# Patient Record
Sex: FEMALE | Race: ASIAN | NOT HISPANIC OR LATINO
[De-identification: names, ages, dates, MRNs, and addresses within clinical notes are randomized per-mention and may not be internally consistent; named-entity substitution may affect disease eponyms.]

---

## 2022-06-09 ENCOUNTER — RESULT REVIEW (OUTPATIENT)
Age: 72
End: 2022-06-09

## 2022-10-17 PROBLEM — Z00.00 ENCOUNTER FOR PREVENTIVE HEALTH EXAMINATION: Status: ACTIVE | Noted: 2022-10-17

## 2022-10-18 ENCOUNTER — NON-APPOINTMENT (OUTPATIENT)
Age: 72
End: 2022-10-18

## 2022-10-18 ENCOUNTER — APPOINTMENT (OUTPATIENT)
Dept: RADIATION ONCOLOGY | Facility: CLINIC | Age: 72
End: 2022-10-18

## 2022-10-18 VITALS — RESPIRATION RATE: 18 BRPM | BODY MASS INDEX: 23.16 KG/M2 | WEIGHT: 139 LBS | HEIGHT: 65 IN

## 2022-10-18 DIAGNOSIS — Z82.49 FAMILY HISTORY OF ISCHEMIC HEART DISEASE AND OTHER DISEASES OF THE CIRCULATORY SYSTEM: ICD-10-CM

## 2022-10-18 DIAGNOSIS — E11.9 TYPE 2 DIABETES MELLITUS W/OUT COMPLICATIONS: ICD-10-CM

## 2022-10-18 DIAGNOSIS — Z78.9 OTHER SPECIFIED HEALTH STATUS: ICD-10-CM

## 2022-10-18 DIAGNOSIS — I10 ESSENTIAL (PRIMARY) HYPERTENSION: ICD-10-CM

## 2022-10-18 PROCEDURE — 99204 OFFICE O/P NEW MOD 45 MIN: CPT | Mod: 25

## 2022-10-18 RX ORDER — LOSARTAN POTASSIUM 100 MG/1
TABLET, FILM COATED ORAL
Refills: 0 | Status: ACTIVE | COMMUNITY

## 2022-10-18 RX ORDER — MULTIVIT-MIN/FOLIC/VIT K/LYCOP 400-300MCG
25 MCG TABLET ORAL
Refills: 0 | Status: ACTIVE | COMMUNITY

## 2022-10-18 RX ORDER — METFORMIN HYDROCHLORIDE 625 MG/1
TABLET ORAL
Refills: 0 | Status: ACTIVE | COMMUNITY

## 2022-10-18 NOTE — PHYSICAL EXAM
[Normal] : well developed, well nourished, in no acute distress [Sclera] : the sclera and conjunctiva were normal [] : no respiratory distress

## 2022-10-18 NOTE — REASON FOR VISIT
[Consideration of Curative Therapy] : consideration of curative therapy for [Home] : at home, [unfilled] , at the time of the visit. [Medical Office: ([facility])___] : at the medical office located in  [Verbal consent obtained from patient] : the patient, [unfilled] [Breast Cancer] : breast cancer

## 2022-10-21 ENCOUNTER — OUTPATIENT (OUTPATIENT)
Dept: OUTPATIENT SERVICES | Facility: HOSPITAL | Age: 72
LOS: 1 days | Discharge: HOME | End: 2022-10-21

## 2022-10-21 DIAGNOSIS — D05.11 INTRADUCTAL CARCINOMA IN SITU OF RIGHT BREAST: ICD-10-CM

## 2022-10-21 PROCEDURE — 77290 THER RAD SIMULAJ FIELD CPLX: CPT | Mod: 26

## 2022-10-21 PROCEDURE — 77333 RADIATION TREATMENT AID(S): CPT | Mod: 26

## 2022-10-21 NOTE — HISTORY OF PRESENT ILLNESS
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 posterior margin <1mm. On arimidex with Dr. ilya chambers. \par \par MRI 7/1/222 - [facility], preop - probably residual disease near clips right breast\par pet 7/8/22 - medial right breast with likely benign b/l neck nodes and oropharynx avidity. Small nonavid lung nodules for 6mo FU. \par Dexa with 6.8 % fracture risk. \par 8/2022 -- right breast lumpectomy in 8/2022 @ [facility]\par \par 10/18/22 -- Today for radiation consult. No chest pain, cough. Will see Med Onc soon.

## 2022-10-21 NOTE — DISEASE MANAGEMENT
[Pathological] : TNM Stage: p [0] : 0 [FreeTextEntry4] : right DCIS [TTNM] : is [NTNM] : x [MTNM] : x

## 2022-11-17 ENCOUNTER — NON-APPOINTMENT (OUTPATIENT)
Age: 72
End: 2022-11-17

## 2022-11-26 NOTE — REVIEW OF SYSTEMS
[Dysphagia: Grade 0] : Dysphagia: Grade 0 [Cough: Grade 0] : Cough: Grade 0 [Alopecia: Grade 0] : Alopecia: Grade 0 [Pruritus: Grade 0] : Pruritus: Grade 0 [Skin Atrophy: Grade 0] : Skin Atrophy: Grade 0 [Skin Hyperpigmentation: Grade 0] : Skin Hyperpigmentation: Grade 0 [Skin Induration: Grade 0] : Skin Induration: Grade 0 [Dermatitis Radiation: Grade 0] : Dermatitis Radiation: Grade 0 [Negative] : Allergic/Immunologic [FreeTextEntry5] : HTN [de-identified] : DM2

## 2022-11-26 NOTE — DISEASE MANAGEMENT
[Pathological] : TNM Stage: p [0] : 0 [FreeTextEntry4] : right DCIS [TTNM] : is [NTNM] : x [MTNM] : x [de-identified] : 5240 cGy [de-identified] : right breast

## 2022-12-01 ENCOUNTER — NON-APPOINTMENT (OUTPATIENT)
Age: 72
End: 2022-12-01

## 2022-12-11 NOTE — HISTORY OF PRESENT ILLNESS
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 posterior margin <1mm. On arimidex with Dr. ilya chambers. \par \par MRI 7/1/222 - [facility], preop - probably residual disease near clips right breast\par pet 7/8/22 - medial right breast with likely benign b/l neck nodes and oropharynx avidity. Small nonavid lung nodules for 6mo FU. \par Dexa with 6.8 % fracture risk. \par 8/2022 -- right breast lumpectomy in 8/2022 @ [facility]\par \par 10/18/22 -- Today for radiation consult. No chest pain, cough. Will see Med Onc soon. \par \par 6/20 fraction of radiation to right breast completed. c/o dry skin over RT area. Using calendula cream, may consider Aquaphor cream too. No cough, dysphagia, chest pain.

## 2022-12-11 NOTE — REVIEW OF SYSTEMS
[Dysphagia: Grade 0] : Dysphagia: Grade 0 [Cough: Grade 0] : Cough: Grade 0 [Alopecia: Grade 0] : Alopecia: Grade 0 [Pruritus: Grade 0] : Pruritus: Grade 0 [Skin Atrophy: Grade 0] : Skin Atrophy: Grade 0 [Skin Hyperpigmentation: Grade 0] : Skin Hyperpigmentation: Grade 0 [Skin Induration: Grade 0] : Skin Induration: Grade 0 [Dermatitis Radiation: Grade 1 - Faint erythema or dry desquamation] : Dermatitis Radiation: Grade 1 - Faint erythema or dry desquamation [Negative] : Allergic/Immunologic [FreeTextEntry5] : HTN [de-identified] : DM2

## 2022-12-11 NOTE — DISEASE MANAGEMENT
[Pathological] : TNM Stage: p [0] : 0 [FreeTextEntry4] : right DCIS [TTNM] : is [NTNM] : x [MTNM] : x [de-identified] : 5240 cGy [de-identified] : right breast

## 2022-12-15 ENCOUNTER — NON-APPOINTMENT (OUTPATIENT)
Age: 72
End: 2022-12-15

## 2022-12-18 NOTE — DISEASE MANAGEMENT
[Pathological] : TNM Stage: p [0] : 0 [FreeTextEntry4] : right DCIS [TTNM] : is [NTNM] : x [MTNM] : x [de-identified] : 5240 cGy [de-identified] : right breast

## 2022-12-18 NOTE — HISTORY OF PRESENT ILLNESS
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 posterior margin <1mm. On arimidex with Dr. ilya chambers. \par \par MRI 7/1/222 - [facility], preop - probably residual disease near clips right breast\par pet 7/8/22 - medial right breast with likely benign b/l neck nodes and oropharynx avidity. Small nonavid lung nodules for 6mo FU. \par Dexa with 6.8 % fracture risk. \par 8/2022 -- right breast lumpectomy in 8/2022 @ [facility]\par \par 10/18/22 -- Today for radiation consult. No chest pain, cough. Will see Med Onc soon. \par \par 11/20 fraction of radiation to right breast completed. c/o dry skin over RT area. Using calendula cream, may consider Aquaphor cream too. No cough, dysphagia, chest pain.

## 2022-12-18 NOTE — REVIEW OF SYSTEMS
[Dysphagia: Grade 0] : Dysphagia: Grade 0 [Cough: Grade 0] : Cough: Grade 0 [Alopecia: Grade 0] : Alopecia: Grade 0 [Pruritus: Grade 0] : Pruritus: Grade 0 [Skin Atrophy: Grade 0] : Skin Atrophy: Grade 0 [Skin Hyperpigmentation: Grade 1 - Hyperpigmentation covering <10% BSA; no psychosocial impact] : Skin Hyperpigmentation: Grade 1 - Hyperpigmentation covering <10% BSA; no psychosocial impact [Skin Induration: Grade 0] : Skin Induration: Grade 0 [Dermatitis Radiation: Grade 1 - Faint erythema or dry desquamation] : Dermatitis Radiation: Grade 1 - Faint erythema or dry desquamation [Negative] : Allergic/Immunologic [FreeTextEntry5] : HTN [de-identified] : DM2

## 2022-12-29 ENCOUNTER — NON-APPOINTMENT (OUTPATIENT)
Age: 72
End: 2022-12-29

## 2022-12-31 NOTE — HISTORY OF PRESENT ILLNESS
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 posterior margin <1mm. On arimidex with Dr. ilya chambers. \par \par MRI 7/1/222 - [facility], preop - probably residual disease near clips right breast\par pet 7/8/22 - medial right breast with likely benign b/l neck nodes and oropharynx avidity. Small nonavid lung nodules for 6mo FU. \par Dexa with 6.8 % fracture risk. \par 8/2022 -- right breast lumpectomy in 8/2022 @ [facility]\par \par 10/18/22 -- Today for radiation consult. No chest pain, cough. Will see Med Onc soon. \par \par 15/20 fraction of radiation to right breast completed. c/o dry skin over RT area. Using calendula cream, may consider Aquaphor cream too. No cough, dysphagia, chest pain.

## 2022-12-31 NOTE — DISEASE MANAGEMENT
[Pathological] : TNM Stage: p [0] : 0 [FreeTextEntry4] : right DCIS [TTNM] : is [NTNM] : x [MTNM] : x [de-identified] : 5240 cGy [de-identified] : right breast

## 2022-12-31 NOTE — REVIEW OF SYSTEMS
[Dysphagia: Grade 0] : Dysphagia: Grade 0 [Cough: Grade 0] : Cough: Grade 0 [Alopecia: Grade 0] : Alopecia: Grade 0 [Pruritus: Grade 0] : Pruritus: Grade 0 [Skin Atrophy: Grade 0] : Skin Atrophy: Grade 0 [Skin Hyperpigmentation: Grade 1 - Hyperpigmentation covering <10% BSA; no psychosocial impact] : Skin Hyperpigmentation: Grade 1 - Hyperpigmentation covering <10% BSA; no psychosocial impact [Skin Induration: Grade 0] : Skin Induration: Grade 0 [Dermatitis Radiation: Grade 1 - Faint erythema or dry desquamation] : Dermatitis Radiation: Grade 1 - Faint erythema or dry desquamation [Negative] : Allergic/Immunologic [FreeTextEntry5] : HTN [de-identified] : DM2

## 2023-01-05 ENCOUNTER — NON-APPOINTMENT (OUTPATIENT)
Age: 73
End: 2023-01-05

## 2023-01-05 DIAGNOSIS — L58.9 RADIODERMATITIS, UNSPECIFIED: ICD-10-CM

## 2023-01-05 RX ORDER — SILVER SULFADIAZINE 10 MG/G
1 CREAM TOPICAL TWICE DAILY
Qty: 1 | Refills: 1 | Status: ACTIVE | COMMUNITY
Start: 2023-01-05 | End: 1900-01-01

## 2023-01-07 NOTE — DISEASE MANAGEMENT
[Pathological] : TNM Stage: p [0] : 0 [FreeTextEntry4] : right DCIS [TTNM] : is [NTNM] : x [MTNM] : x [de-identified] : 5240 cGy [de-identified] : right breast

## 2023-01-07 NOTE — REVIEW OF SYSTEMS
[Dysphagia: Grade 0] : Dysphagia: Grade 0 [Cough: Grade 0] : Cough: Grade 0 [Alopecia: Grade 0] : Alopecia: Grade 0 [Pruritus: Grade 0] : Pruritus: Grade 0 [Skin Atrophy: Grade 0] : Skin Atrophy: Grade 0 [Skin Hyperpigmentation: Grade 1 - Hyperpigmentation covering <10% BSA; no psychosocial impact] : Skin Hyperpigmentation: Grade 1 - Hyperpigmentation covering <10% BSA; no psychosocial impact [Skin Induration: Grade 0] : Skin Induration: Grade 0 [Dermatitis Radiation: Grade 1 - Faint erythema or dry desquamation] : Dermatitis Radiation: Grade 1 - Faint erythema or dry desquamation [Negative] : Allergic/Immunologic [FreeTextEntry5] : HTN [de-identified] : DM2

## 2023-01-07 NOTE — HISTORY OF PRESENT ILLNESS
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 posterior margin <1mm. On arimidex with Dr. ilya chambers. \par \par MRI 7/1/222 - [facility], preop - probably residual disease near clips right breast\par pet 7/8/22 - medial right breast with likely benign b/l neck nodes and oropharynx avidity. Small nonavid lung nodules for 6mo FU. \par Dexa with 6.8 % fracture risk. \par 8/2022 -- right breast lumpectomy in 8/2022 @ [facility]\par \par 10/18/22 -- Today for radiation consult. No chest pain, cough. Will see Med Onc soon. \par \par 19/20 fraction of radiation to right breast completed. c/o dry skin over RT area. Using calendula cream and  Aquaphor cream too. Rx silvadene cream given today. No cough, dysphagia, chest pain.

## 2023-02-01 ENCOUNTER — NON-APPOINTMENT (OUTPATIENT)
Age: 73
End: 2023-02-01

## 2023-02-08 ENCOUNTER — APPOINTMENT (OUTPATIENT)
Dept: RADIATION ONCOLOGY | Facility: CLINIC | Age: 73
End: 2023-02-08
Payer: MEDICARE

## 2023-02-08 PROCEDURE — 99024 POSTOP FOLLOW-UP VISIT: CPT

## 2023-02-12 NOTE — END OF VISIT
[] : Resident [FreeTextEntry3] : Agree with above. RTC in 3m.  [Time Spent: ___ minutes] : I have spent [unfilled] minutes of time on the encounter. [>50% of the face to face encounter time was spent on counseling and/or coordination of care for ___] : Greater than 50% of the face to face encounter time was spent on counseling and/or coordination of care for [unfilled]

## 2023-02-12 NOTE — DISEASE MANAGEMENT
[Pathological] : TNM Stage: p [0] : 0 [FreeTextEntry4] : right DCIS [TTNM] : is [NTNM] : x [MTNM] : x [de-identified] : 5240 cGy [de-identified] : right breast

## 2023-02-12 NOTE — HISTORY OF PRESENT ILLNESS
[FreeTextEntry1] : 72F diabetic with DCIS medial right breast s/p R lumpectomy 9/8/22 with 21mm  G2 DCIS ER NV+ with necrosis 9/8/22. posterior margin <1mm. On arimidex with Dr. ilya chambers before radiation treatment started. h/o 5240 cGy / 20 fractions of radiation to right breast in early 1/2023.\par \par MRI 7/1/222 - [facility], preop - probably residual disease near clips right breast\par pet 7/8/22 - medial right breast with likely benign b/l neck nodes and oropharynx avidity. Small nonavid lung nodules for 6mo FU. \par Dexa with 6.8 % fracture risk. \par 8/2022 -- right breast lumpectomy in 8/2022 @ [facility]\par \par 10/18/22 -- Today for radiation consult. No chest pain, cough. Will see Med Onc soon. \par \par Last OTV note during radiation in early 1/2023: 19/20 fraction of radiation to right breast completed. c/o dry skin over RT area. Using calendula cream and  Aquaphor cream too. Rx silvadene cream given today. No cough, dysphagia, chest pain. \par \par 2/8/2023 F/U: Pt completed 5240 cGy / 20 fractions of radiation to right breast in early 1/2023. On Hormone therapy with Med Onc Dr Chambers @ [facility] before RT started, advised to continue f/u with Med Onc.

## 2023-02-12 NOTE — REVIEW OF SYSTEMS
[Negative] : Allergic/Immunologic [Dysphagia: Grade 0] : Dysphagia: Grade 0 [Cough: Grade 0] : Cough: Grade 0 [Alopecia: Grade 0] : Alopecia: Grade 0 [Pruritus: Grade 0] : Pruritus: Grade 0 [Skin Atrophy: Grade 0] : Skin Atrophy: Grade 0 [Skin Hyperpigmentation: Grade 1 - Hyperpigmentation covering <10% BSA; no psychosocial impact] : Skin Hyperpigmentation: Grade 1 - Hyperpigmentation covering <10% BSA; no psychosocial impact [Skin Induration: Grade 0] : Skin Induration: Grade 0 [Dermatitis Radiation: Grade 1 - Faint erythema or dry desquamation] : Dermatitis Radiation: Grade 1 - Faint erythema or dry desquamation [FreeTextEntry5] : HTN [de-identified] : DM2

## 2023-02-12 NOTE — PHYSICAL EXAM
[Sclera] : the sclera and conjunctiva were normal [PERRL With Normal Accommodation] : pupils were equal in size, round, reactive to light [] : no respiratory distress [Exaggerated Use Of Accessory Muscles For Inspiration] : no accessory muscle use [No UE Edema] : there is no upper extremity edema [Normal] : oriented to person, place and time, the affect was normal, the mood was normal and not anxious [de-identified] : right breaset with resolving dermatitis, continued mild hyperpigmentation along axillary fold and splotchy hypopigmentation at nipple, no masses appreciated,

## 2023-05-24 ENCOUNTER — APPOINTMENT (OUTPATIENT)
Dept: RADIATION ONCOLOGY | Facility: CLINIC | Age: 73
End: 2023-05-24
Payer: MEDICARE

## 2023-05-24 NOTE — HISTORY OF PRESENT ILLNESS
[FreeTextEntry1] : 72F diabetic with DCIS medial right breast s/p R lumpectomy 9/8/22 with 21mm  G2 DCIS ER GA+ with necrosis 9/8/22. posterior margin <1mm. On arimidex with Dr. ilya chambers before radiation treatment started. h/o 5240 cGy / 20 fractions of radiation to right breast in early 1/2023.\par \par MRI 7/1/222 - [facility], preop - probably residual disease near clips right breast\par pet 7/8/22 - medial right breast with likely benign b/l neck nodes and oropharynx avidity. Small nonavid lung nodules for 6mo FU. \par Dexa with 6.8 % fracture risk. \par 8/2022 -- right breast lumpectomy in 8/2022 @ [facility]\par \par 10/18/22 -- Today for radiation consult. No chest pain, cough. Will see Med Onc soon. \par \par Last OTV note during radiation in early 1/2023: 19/20 fraction of radiation to right breast completed. c/o dry skin over RT area. Using calendula cream and  Aquaphor cream too. Rx silvadene cream given today. No cough, dysphagia, chest pain. \par \par 2/8/2023 F/U: Pt completed 5240 cGy / 20 fractions of radiation to right breast in early 1/2023. On Hormone therapy with Med Onc Dr Chambers @ [facility] before RT started, advised to continue f/u with Med Onc. \par \par 5/24/23: Patient presents for follow up.

## 2023-05-24 NOTE — DISEASE MANAGEMENT
[Pathological] : TNM Stage: p [0] : 0 [FreeTextEntry4] : right DCIS [TTNM] : is [NTNM] : x [MTNM] : x [de-identified] : 5240 cGy [de-identified] : right breast

## 2023-05-24 NOTE — END OF VISIT
[] : Resident [Time Spent: ___ minutes] : I have spent [unfilled] minutes of time on the encounter. [>50% of the face to face encounter time was spent on counseling and/or coordination of care for ___] : Greater than 50% of the face to face encounter time was spent on counseling and/or coordination of care for [unfilled] [FreeTextEntry3] : Agree with above. RTC in 3m.

## 2023-05-24 NOTE — PHYSICAL EXAM
[Sclera] : the sclera and conjunctiva were normal [PERRL With Normal Accommodation] : pupils were equal in size, round, reactive to light [] : no respiratory distress [Exaggerated Use Of Accessory Muscles For Inspiration] : no accessory muscle use [No UE Edema] : there is no upper extremity edema [Normal] : oriented to person, place and time, the affect was normal, the mood was normal and not anxious [de-identified] : right breaset with resolving dermatitis, continued mild hyperpigmentation along axillary fold and splotchy hypopigmentation at nipple, no masses appreciated,

## 2023-05-24 NOTE — REVIEW OF SYSTEMS
[Negative] : Allergic/Immunologic [Dysphagia: Grade 0] : Dysphagia: Grade 0 [Cough: Grade 0] : Cough: Grade 0 [Alopecia: Grade 0] : Alopecia: Grade 0 [Pruritus: Grade 0] : Pruritus: Grade 0 [Skin Atrophy: Grade 0] : Skin Atrophy: Grade 0 [Skin Hyperpigmentation: Grade 1 - Hyperpigmentation covering <10% BSA; no psychosocial impact] : Skin Hyperpigmentation: Grade 1 - Hyperpigmentation covering <10% BSA; no psychosocial impact [Skin Induration: Grade 0] : Skin Induration: Grade 0 [Dermatitis Radiation: Grade 1 - Faint erythema or dry desquamation] : Dermatitis Radiation: Grade 1 - Faint erythema or dry desquamation [FreeTextEntry5] : HTN [de-identified] : DM2

## 2023-05-25 ENCOUNTER — APPOINTMENT (OUTPATIENT)
Dept: RADIATION ONCOLOGY | Facility: CLINIC | Age: 73
End: 2023-05-25
Payer: MEDICARE

## 2023-05-25 VITALS — WEIGHT: 144 LBS | HEIGHT: 65 IN | BODY MASS INDEX: 23.99 KG/M2 | RESPIRATION RATE: 18 BRPM

## 2023-05-25 PROCEDURE — 99214 OFFICE O/P EST MOD 30 MIN: CPT

## 2023-05-25 NOTE — PHYSICAL EXAM
[Sclera] : the sclera and conjunctiva were normal [Extraocular Movements] : extraocular movements were intact [Outer Ear] : the ears and nose were normal in appearance [Hearing Threshold Finger Rub Not Bibb] : hearing was normal [Examination Of The Oral Cavity] : the lips and gums were normal [] : no respiratory distress [No Focal Deficits] : no focal deficits [Normal] : oriented to person, place and time, the affect was normal, the mood was normal and not anxious

## 2023-05-29 NOTE — REVIEW OF SYSTEMS
[Negative] : Allergic/Immunologic [Dysphagia: Grade 0] : Dysphagia: Grade 0 [Cough: Grade 0] : Cough: Grade 0 [Alopecia: Grade 0] : Alopecia: Grade 0 [Pruritus: Grade 0] : Pruritus: Grade 0 [Skin Atrophy: Grade 0] : Skin Atrophy: Grade 0 [Skin Hyperpigmentation: Grade 0] : Skin Hyperpigmentation: Grade 0 [Skin Induration: Grade 0] : Skin Induration: Grade 0 [Dermatitis Radiation: Grade 0] : Dermatitis Radiation: Grade 0 [FreeTextEntry5] : HTN [de-identified] : DM2

## 2023-05-29 NOTE — DISEASE MANAGEMENT
[Pathological] : TNM Stage: p [0] : 0 [FreeTextEntry4] : right DCIS [TTNM] : is [NTNM] : x [MTNM] : x [de-identified] : 5240 cGy [de-identified] : right breast

## 2023-05-29 NOTE — END OF VISIT
[] : Resident [FreeTextEntry3] : Agree with above, RTC in 3m.  [Time Spent: ___ minutes] : I have spent [unfilled] minutes of time on the encounter. [>50% of the face to face encounter time was spent on counseling and/or coordination of care for ___] : Greater than 50% of the face to face encounter time was spent on counseling and/or coordination of care for [unfilled]

## 2023-08-09 NOTE — HISTORY OF PRESENT ILLNESS
Continue current medication regimen.     If you have the feeling of acid coming up take Mylanta liquid.    Please contact our office to update us on the Mylanta.     Can increase Omeprazole to twice daily if you have increased symptoms and the Mylanta helps.    For Questions:  Leonarda Pool DO, FACAAI Ruthann Peck, PA-C  [facility]  [facility] [place]  Phone [phone] (allergy clinic), [phone] ([facility])  Fax [phone]  - Or use [facility] to contact our clinic - messages sent through [employer] go directly to our nursing staff inbox      [FreeTextEntry1] : 73F diabetic with DCIS medial right breast s/p R lumpectomy 9/8/22 with 21mm  G2 DCIS ER WI+ with necrosis 9/8/22. posterior margin <1mm. On arimidex with Dr. ilya chambers before radiation treatment started. h/o 5240 cGy / 20 fractions of radiation to right breast in early 1/2023.\par \par MRI 7/1/222 - [facility], preop - probably residual disease near clips right breast\par pet 7/8/22 - medial right breast with likely benign b/l neck nodes and oropharynx avidity. Small nonavid lung nodules for 6mo FU. \par Dexa with 6.8 % fracture risk. \par 8/2022 -- right breast lumpectomy in 8/2022 @ [facility]\par \par 10/18/22 -- Today for radiation consult. No chest pain, cough. Will see Med Onc soon. \par \par Last OTV note during radiation in early 1/2023: 19/20 fraction of radiation to right breast completed. c/o dry skin over RT area. Using calendula cream and  Aquaphor cream too. Rx silvadene cream given today. No cough, dysphagia, chest pain. \par \par 5/25/2023 F/U: Pt completed 5240 cGy / 20 fractions of radiation to right breast in early 1/2023. On Hormone therapy with Med Onc @ [facility].  Will continue f/u with Med Onc in 9/2023 for possible mammography.

## 2023-11-28 ENCOUNTER — APPOINTMENT (OUTPATIENT)
Dept: RADIATION ONCOLOGY | Facility: CLINIC | Age: 73
End: 2023-11-28

## 2023-12-12 NOTE — HISTORY OF PRESENT ILLNESS
----- Message from Gretchen Hubbard DO sent at 12/11/2023  3:25 PM EST -----  Report to pt her bone density shows osteopenia and should repeat in 2 years.  Needs to stay on D3 supplement and calcium, do weight bearing exercise.   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 posterior margin <1mm. On arimidex with Dr. ilya chambers. \par \par MRI 7/1/222 - [facility], preop - probably residual disease near clips right breast\par pet 7/8/22 - medial right breast with likely benign b/l neck nodes and oropharynx avidity. Small nonavid lung nodules for 6mo FU. \par Dexa with 6.8 % fracture risk. \par 8/2022 -- right breast lumpectomy in 8/2022 @ [facility]\par \par 10/18/22 -- Today for radiation consult. No chest pain, cough. Will see Med Onc soon. \par \par 1/20 fraction of radiation to right breast completed. No cough, dysphagia, chest pain.